# Patient Record
Sex: FEMALE | Race: BLACK OR AFRICAN AMERICAN
[De-identification: names, ages, dates, MRNs, and addresses within clinical notes are randomized per-mention and may not be internally consistent; named-entity substitution may affect disease eponyms.]

---

## 2020-07-15 ENCOUNTER — HOSPITAL ENCOUNTER (EMERGENCY)
Dept: HOSPITAL 62 - ER | Age: 65
Discharge: LEFT BEFORE DISCHARGE | End: 2020-07-15
Payer: COMMERCIAL

## 2020-07-15 VITALS — DIASTOLIC BLOOD PRESSURE: 88 MMHG | SYSTOLIC BLOOD PRESSURE: 146 MMHG

## 2020-07-15 DIAGNOSIS — R11.2: ICD-10-CM

## 2020-07-15 DIAGNOSIS — R10.2: ICD-10-CM

## 2020-07-15 DIAGNOSIS — R19.7: ICD-10-CM

## 2020-07-15 DIAGNOSIS — K57.30: ICD-10-CM

## 2020-07-15 DIAGNOSIS — K40.30: Primary | ICD-10-CM

## 2020-07-15 DIAGNOSIS — K56.699: ICD-10-CM

## 2020-07-15 LAB
ADD MANUAL DIFF: NO
ALBUMIN SERPL-MCNC: 4.2 G/DL (ref 3.5–5)
ALP SERPL-CCNC: 80 U/L (ref 38–126)
ANION GAP SERPL CALC-SCNC: 6 MMOL/L (ref 5–19)
APPEARANCE UR: (no result)
APTT PPP: YELLOW S
AST SERPL-CCNC: 23 U/L (ref 14–36)
BASOPHILS # BLD AUTO: 0 10^3/UL (ref 0–0.2)
BASOPHILS NFR BLD AUTO: 0.9 % (ref 0–2)
BILIRUB DIRECT SERPL-MCNC: 0.1 MG/DL (ref 0–0.4)
BILIRUB SERPL-MCNC: 0.7 MG/DL (ref 0.2–1.3)
BILIRUB UR QL STRIP: NEGATIVE
BUN SERPL-MCNC: 9 MG/DL (ref 7–20)
CALCIUM: 9.5 MG/DL (ref 8.4–10.2)
CHLORIDE SERPL-SCNC: 103 MMOL/L (ref 98–107)
CO2 SERPL-SCNC: 30 MMOL/L (ref 22–30)
EOSINOPHIL # BLD AUTO: 0 10^3/UL (ref 0–0.6)
EOSINOPHIL NFR BLD AUTO: 0.6 % (ref 0–6)
ERYTHROCYTE [DISTWIDTH] IN BLOOD BY AUTOMATED COUNT: 15.4 % (ref 11.5–14)
GLUCOSE SERPL-MCNC: 123 MG/DL (ref 75–110)
GLUCOSE UR STRIP-MCNC: NEGATIVE MG/DL
HCT VFR BLD CALC: 42.1 % (ref 36–47)
HGB BLD-MCNC: 14.2 G/DL (ref 12–15.5)
KETONES UR STRIP-MCNC: (no result) MG/DL
LYMPHOCYTES # BLD AUTO: 1.3 10^3/UL (ref 0.5–4.7)
LYMPHOCYTES NFR BLD AUTO: 23.5 % (ref 13–45)
MCH RBC QN AUTO: 31.7 PG (ref 27–33.4)
MCHC RBC AUTO-ENTMCNC: 33.7 G/DL (ref 32–36)
MCV RBC AUTO: 94 FL (ref 80–97)
MONOCYTES # BLD AUTO: 0.5 10^3/UL (ref 0.1–1.4)
MONOCYTES NFR BLD AUTO: 8.3 % (ref 3–13)
NEUTROPHILS # BLD AUTO: 3.7 10^3/UL (ref 1.7–8.2)
NEUTS SEG NFR BLD AUTO: 66.7 % (ref 42–78)
NITRITE UR QL STRIP: NEGATIVE
PH UR STRIP: 5 [PH] (ref 5–9)
PLATELET # BLD: 191 10^3/UL (ref 150–450)
POTASSIUM SERPL-SCNC: 3.9 MMOL/L (ref 3.6–5)
PROT SERPL-MCNC: 8.1 G/DL (ref 6.3–8.2)
PROT UR STRIP-MCNC: 30 MG/DL
RBC # BLD AUTO: 4.48 10^6/UL (ref 3.72–5.28)
SP GR UR STRIP: 1.02
TOTAL CELLS COUNTED % (AUTO): 100 %
UROBILINOGEN UR-MCNC: NEGATIVE MG/DL (ref ?–2)
WBC # BLD AUTO: 5.6 10^3/UL (ref 4–10.5)

## 2020-07-15 PROCEDURE — 81001 URINALYSIS AUTO W/SCOPE: CPT

## 2020-07-15 PROCEDURE — 80053 COMPREHEN METABOLIC PANEL: CPT

## 2020-07-15 PROCEDURE — 87086 URINE CULTURE/COLONY COUNT: CPT

## 2020-07-15 PROCEDURE — 85025 COMPLETE CBC W/AUTO DIFF WBC: CPT

## 2020-07-15 PROCEDURE — 96361 HYDRATE IV INFUSION ADD-ON: CPT

## 2020-07-15 PROCEDURE — 74177 CT ABD & PELVIS W/CONTRAST: CPT

## 2020-07-15 PROCEDURE — 99284 EMERGENCY DEPT VISIT MOD MDM: CPT

## 2020-07-15 PROCEDURE — 96376 TX/PRO/DX INJ SAME DRUG ADON: CPT

## 2020-07-15 PROCEDURE — 36415 COLL VENOUS BLD VENIPUNCTURE: CPT

## 2020-07-15 PROCEDURE — 96365 THER/PROPH/DIAG IV INF INIT: CPT

## 2020-07-15 PROCEDURE — 96375 TX/PRO/DX INJ NEW DRUG ADDON: CPT

## 2020-07-15 NOTE — PDOC CONSULTATION
Consultation


Consult Date: 07/15/20


Provider Consulted: VITO URBINA


Consult reason:: Incarcerated left inguinal hernia





History of Present Illness


Admission Date/PCP: 


  





  CHAS COOLEY PA-C





History of Present Illness: 


NIKO STREETER is a 65 year old female who started complaining of pains along 

the left inguinal area with a lump last night.  Patient does claim that she has 

been very active and may have lifted something heavy.  She complained of nausea 

and pains along the left inguinal area.  She had a CT scan of the abdomen which 

showed incarcerated left inguinal hernia with starting bowel obstruction.  

Attempt to reduce the hernia by ED provider was done.  I also attempted to 

reduce the hernia in the ED but unable to do so.  At this point I did inform the

patient that she needs an emergency reduction and repair of the incarcerated 

left inguinal hernia.  She tells me she wants to go to [place] if she needs 

an operation.  I called her son and told him the need for repair of the hernia 

and the son also feels like they would prefer to go to [place].  I told him 

that have to get a surgeon there and they may have to cover transfer expenses.  

I then went back to talk with the patient and she gave me her phone since her 

brother would like to talk to me.  Mention to him about the need for an emergent

reduction of the inguinal hernia.  The patient tells me that her family really 

wanted her to go to [place].  Since we do the operation at [facility] 

the they have to make the arrangements or she may have to sign AMA and then go 

to their emergency room soon as possible.











Social History


Smoking Status: Never Smoker





Family History


Parental Family History Reviewed: Yes


Children Family History Reviewed: No


Sibling(s) Family History Reviewed.: No





Medication/Allergy


Allergies/Adverse Reactions: 


                                        





No Known Allergies Allergy (Verified 07/15/20 12:54)


   











Review of Systems


Review of Systems: 





Left inguinal pains with nausea





Physical Exam


Vital Signs: 


                                        











Temp Pulse Resp BP Pulse Ox


 


 98.6 F   70   20   146/88 H  98 


 


 07/15/20 18:00  07/15/20 17:52  07/15/20 17:52  07/15/20 17:52  07/15/20 17:52








                                 Intake & Output











 07/14/20 07/15/20 07/16/20





 06:59 06:59 06:59


 


Intake Total   1050


 


Balance   1050


 


Weight   97.522 kg











Exam: 





Also lump on the left groin that is tender on palpation.  I failed to reduce the

inguinal hernia in the ED.  Also attempt by ED provider after giving fentanyl 

was unsuccessful





Results


Laboratory Results: 


                                        





                                 07/15/20 12:25 





                                 07/15/20 12:25 





                                        











  07/15/20 07/15/20 07/15/20





  12:25 12:25 12:37


 


WBC  5.6  


 


RBC  4.48  


 


Hgb  14.2  


 


Hct  42.1  


 


MCV  94  


 


MCH  31.7  


 


MCHC  33.7  


 


RDW  15.4 H  


 


Plt Count  191  


 


Seg Neutrophils %  66.7  


 


Sodium   139.3 


 


Potassium   3.9 


 


Chloride   103 


 


Carbon Dioxide   30 


 


Anion Gap   6 


 


BUN   9 


 


Creatinine   0.94 


 


Est GFR ( Amer)   > 60 


 


Glucose   123 H 


 


Calcium   9.5 


 


Total Bilirubin   0.7 


 


AST   23 


 


Alkaline Phosphatase   80 


 


Total Protein   8.1 


 


Albumin   4.2 


 


Urine Color    YELLOW


 


Urine Appearance    SLIGHTLY-CLOUDY


 


Urine pH    5.0


 


Ur Specific Gravity    1.019


 


Urine Protein    30 H


 


Urine Glucose (UA)    NEGATIVE


 


Urine Ketones    TRACE H


 


Urine Blood    MODERATE H


 


Urine Nitrite    NEGATIVE


 


Ur Leukocyte Esterase    LARGE H


 


Urine WBC (Auto)    30


 


Urine RBC (Auto)    6











Impressions: 


                                        





Abdomen/Pelvis CT  07/15/20 14:41


IMPRESSION:  1.  Apparently incarcerated left inguinal hernia.  Correlate 

clinically for strangulation.


2.  Small bowel obstruction secondary to the above.


3.  Sigmoid diverticulosis.


4.  Probably uterine fibroids.


 














Assessment & Plan





- Diagnosis


(1) Incarcerated hernia


Is this a current diagnosis for this admission?: Yes   





- Time


Time Spent: 30 to 50 Minutes





- Plan Summary


Plan Summary: 





65-year-old female who suddenly noted painful lump on the left groin last night.

 This was associated with nausea.  CT scan of the abdomen revealed an 

incarcerated left inguinal hernia with starting bowel obstruction.  Attempts to 

reduce the hernia in the ER was unsuccessful.  Patient was told of need for 

emergent reduction and repair of left inguinal hernia.  Patient was insistent on

going to [place] if she needs an operation.  This was even after talking to 

patient's son and brother.  Though prior to patient's final decision of leaving 

the hospital AMA she said that her family actually wanted her to go to 

[place].

## 2020-07-15 NOTE — RADIOLOGY REPORT (SQ)
EXAM DESCRIPTION:  CT ABD/PELVIS WITH IV ONLY



IMAGES COMPLETED DATE/TIME:  7/15/2020 3:34 pm



REASON FOR STUDY:  LLQ, pelvic pain, +palp firm mass to lower abd



COMPARISON:  None.



TECHNIQUE:  CT scan of the abdomen and pelvis performed using helical scanning technique with dynamic
 intravenous contrast injection.  No oral contrast. Images reviewed with lung, soft tissue, and bone 
windows. Reconstructed coronal and sagittal MPR images reviewed. Delayed images for evaluation of the
 urinary system also acquired. All images stored on PACS.

All CT scanners at this facility use dose modulation, iterative reconstruction, and/or weight based d
osing when appropriate to reduce radiation dose to as low as reasonably achievable (ALARA).

[facility]: Dose Right  [facility]: CareDose    [facility]: Dose Right    [facility]: Teradose 4D    [facility]: Smart Technologies



CONTRAST TYPE AND DOSE:  contrast/concentration: Isovue 350.00 mmol/ml; Total Contrast Delivered: 100
.0 ml; Total Saline Delivered: 72.0 ml



RENAL FUNCTION:  BUN 9 creatinine 0.94



RADIATION DOSE:  CT Rad equipment meets quality standard of care and radiation dose reduction techniq
ues were employed. CTDIvol: NaN - NaN mGy. DLP: 0 mGy-cm..



LIMITATIONS:  None.



FINDINGS:  LOWER CHEST: No significant findings. No nodules or infiltrates.

LIVER: Normal size. No masses.  No dilated ducts.

SPLEEN: Normal size. No focal lesions.

PANCREAS: No masses. No significant calcifications. No adjacent inflammation or peripancreatic fluid 
collections. Pancreatic duct not dilated.

GALLBLADDER: No identified stones by CT criteria. No inflammatory changes to suggest cholecystitis.

ADRENAL GLANDS: No significant masses or asymmetry.

RIGHT KIDNEY AND URETER: No solid masses.   No significant calcifications.   No hydronephrosis or hyd
roureter.

LEFT KIDNEY AND URETER: No solid masses.   No significant calcifications.   No hydronephrosis or hydr
oureter.

AORTA AND VESSELS: No aneurysm. No dissection. Renal arteries, SMA, celiac without stenosis.

RETROPERITONEUM: No retroperitoneal adenopathy, hemorrhage or masses.

BOWEL AND PERITONEAL CAVITY: Dilated duodenum and jejunum.  The ileum is not dilated.  No bowel mass 
is appreciated.  There is minimal free fluid in the abdomen.  Sigmoid diverticulosis with no acute in
flammation.

APPENDIX: Not identified.

PELVIS: Prominent uterine fibroids are present.

ABDOMINAL WALL: There is a large left inguinal hernia that contains bowel.

BONES: No significant osseous abnormality.

OTHER: No other significant finding.



IMPRESSION:  1.  Apparently incarcerated left inguinal hernia.  Correlate clinically for strangulatio
n.

2.  Small bowel obstruction secondary to the above.

3.  Sigmoid diverticulosis.

4.  Probably uterine fibroids.



COMMENT:   Pertinent findings on the imaging study reported as a CRITICAL RESULT to ROQUE GAUTAM NP
 at15:57 on 7/15/2020.

Category of Critical Result: Incarcerated inguinal hernia



TECHNICAL DOCUMENTATION:  JOB ID:  [id number]

Quality ID # [id number]: Final reports with documentation of one or more dose reduction techniques (e.g., Au
tomated exposure control, adjustment of the mA and/or kV according to patient size, use of iterative 
reconstruction technique)

 2011 [email or web address]- All Rights Reserved



Reading location - IP/workstation name: [email or web address]

## 2020-07-15 NOTE — ER DOCUMENT REPORT
ED GI/





- General


Chief Complaint: Abdominal Pain


Stated Complaint: ABDOMINAL PAIN,VOMITING,DIARRHEA


Time Seen by Provider: 07/15/20 14:04


Primary Care Provider: 


CHAS COOLEY PA-C [Primary Care Provider] - Follow up as needed


Mode of Arrival: Ambulatory


Information source: Patient


Notes: 





Patient presents complaining of lower abdominal pain for the past 3 days.  

Patient states that she has had nausea vomiting diarrhea.  Patient states she 

vomited yesterday although has not had any emesis today.  Patient states she is 

had diarrhea x4 episodes.  Patient denies any fever or dysuria symptoms.  

Patient denies any blood in her vomit or stool.





- HPI


Patient complains to provider of: Abdominal pain


Onset: Other - 3 days


Timing/Duration: Worse


Quality of pain: Achy


Pain Level: 4


Location: Pelvis


Vaginal bleeding (Compared to normal period): None


Associated symptoms: Diarrhea, Vomiting.  denies: Dysuria, Fever, Urinary 

hesitancy, Urinary frequency, Urinary retention, Urinary urgency, Vaginal 

discharge


Exacerbated by: Movement


Relieved by: Denies


Similar symptoms previously: No


Recently seen / treated by doctor: No





- Related Data


Allergies/Adverse Reactions: 


                                        





No Known Allergies Allergy (Verified 07/15/20 12:54)


   











Past Medical History





- General


Information source: Patient





- Social History


Smoking Status: Never Smoker


Frequency of alcohol use: None


Drug Abuse: None


Occupation: None


Family History: Reviewed & Not Pertinent


Patient has homicidal ideation: No





- Medical History


Medical History: Negative


Past Surgical History: Reports: Hx Breast Surgery - cyst removal





Review of Systems





- Review of Systems


Constitutional: No symptoms reported.  denies: Fever


EENT: No symptoms reported


Cardiovascular: No symptoms reported


Respiratory: No symptoms reported.  denies: Cough


Gastrointestinal: Abdominal pain, Diarrhea, Nausea, Vomiting


Genitourinary: No symptoms reported.  denies: Burning


Female Genitourinary: No symptoms reported


Musculoskeletal: No symptoms reported.  denies: Back pain


Skin: No symptoms reported


Hematologic/Lymphatic: No symptoms reported


Neurological/Psychological: No symptoms reported





Physical Exam





- Vital signs


Vitals: 


                                        











Temp


 


 98.3 F 


 


 07/15/20 11:55














- General


General appearance: Appears well, Alert


In distress: Mild





- HEENT


Head: Normocephalic, Atraumatic


Eyes: Normal


Conjunctiva: Normal


Nasal: Normal


Mouth/Lips: Normal


Mucous membranes: Normal


Neck: Normal, Supple.  No: Lymphadenopathy





- Respiratory


Respiratory status: No respiratory distress


Chest status: Nontender


Breath sounds: Normal.  No: Rales, Rhonchi, Stridor, Wheezing


Chest palpation: Normal





- Cardiovascular


Rhythm: Regular


Heart sounds: S1 appreciated, S2 appreciated


Murmur: No





- Abdominal


Inspection: Obese


Distension: No distension


Bowel sounds: Normal


Tenderness: Tender - Suprapubic, left lower pelvic tenderness, patient with 

palpable tender firm mass to lower pelvic area


Organomegaly: No organomegaly





- Back


Back: Normal, Nontender.  No: CVA tenderness





- Extremities


General upper extremity: Normal inspection, Nontender, Normal strength


General lower extremity: Normal inspection, Nontender, Normal strength





- Neurological


Neuro grossly intact: Yes


Cognition: Normal


Orientation: AAOx4


Deborah Coma Scale Eye Opening: Spontaneous


Deborah Coma Scale Verbal: Oriented


Wolbach Coma Scale Motor: Obeys Commands


Deborah Coma Scale Total: 15





- Psychological


Associated symptoms: Normal affect, Normal mood





- Skin


Skin Temperature: Warm


Skin Moisture: Dry


Skin Color: Normal





Course





- Re-evaluation


Re-evalutation: 





07/15/20 16:05


Received call from radiology staff stating that patient has an incarcerated 

hernia.  Attempted consultation with surgeon, Dr. Nava currently in a 

surgery, will call back.  Charge nurse notified of need for rapid COVID test.


07/15/20 16:47


Patient is refusing rapid, testing at this time.  Patient states that she does 

not want to stay and that she has too many things to do at home.  Patient also 

states she is trying to go to her grandsons graduation.  Patient advised that 

she has a potential life-threatening surgical emergency and that she needs to 

stay for further evaluation and management of her symptoms.  Patient is 

agreeable with staying and being evaluated by the surgeon although continues to 

voice that she does not want to be admitted at this time.


07/15/20 16:59


Dr. Nava to evaluate patient, advises giving patient 100 mcg of fentanyl to 

help with pain at this time.


07/15/20 17:18


Surgeon states that he spoke with patient and she is insistent on wanting to go 

to [place].  He advises having her sign AMA at this time.  Advises canceling 

the dose of the fentanyl.


07/15/20 


Patient was advised that she has a potentially life-threatening emergent 

surgical problem that needs immediate intervention.  The patient has decided not

to proceed with further recommended testing or treatment to determine the cause 

of her symptoms.  The risk and alternatives to the recommendation were discussed

the patient voiced understanding.  The patient appears clinically to have the 

capacity to make this decision.  The patient was instructed that they could 

return to the ER at any time to complete the testing or treatment.











- Vital Signs


Vital signs: 


                                        











Temp Pulse Resp BP Pulse Ox


 


 98.6 F   70   20   146/88 H  98 


 


 07/15/20 18:00  07/15/20 17:52  07/15/20 17:52  07/15/20 17:52  07/15/20 17:52














- Laboratory


Result Diagrams: 


                                 07/15/20 12:25





                                 07/15/20 12:25


Laboratory results interpreted by me: 


                                        











  07/15/20 07/15/20 07/15/20





  12:25 12:25 12:37


 


RDW  15.4 H  


 


Glucose   123 H 


 


Urine Protein    30 H


 


Urine Ketones    TRACE H


 


Urine Blood    MODERATE H


 


Ur Leukocyte Esterase    LARGE H











07/15/20 21:41





                              Labs- All tests 24 hr











  07/15/20 07/15/20 07/15/20





  12:25 12:25 12:37


 


WBC  5.6  


 


RBC  4.48  


 


Hgb  14.2  


 


Hct  42.1  


 


MCV  94  


 


MCH  31.7  


 


MCHC  33.7  


 


RDW  15.4 H  


 


Plt Count  191  


 


Lymph % (Auto)  23.5  


 


Mono % (Auto)  8.3  


 


Eos % (Auto)  0.6  


 


Baso % (Auto)  0.9  


 


Absolute Neuts (auto)  3.7  


 


Absolute Lymphs (auto)  1.3  


 


Absolute Monos (auto)  0.5  


 


Absolute Eos (auto)  0.0  


 


Absolute Basos (auto)  0.0  


 


Seg Neutrophils %  66.7  


 


Sodium   139.3 


 


Potassium   3.9 


 


Chloride   103 


 


Carbon Dioxide   30 


 


Anion Gap   6 


 


BUN   9 


 


Creatinine   0.94 


 


Est GFR ( Amer)   > 60 


 


Est GFR (MDRD) Non-Af   > 60 


 


Glucose   123 H 


 


Calcium   9.5 


 


Total Bilirubin   0.7 


 


Direct Bilirubin   0.1 


 


Neonat Total Bilirubin   Not Reportable 


 


Neonat Direct Bilirubin   Not Reportable 


 


Neonat Indirect Bili   Not Reportable 


 


AST   23 


 


ALT   30 


 


Alkaline Phosphatase   80 


 


Total Protein   8.1 


 


Albumin   4.2 


 


Urine Color    YELLOW


 


Urine Appearance    SLIGHTLY-CLOUDY


 


Urine pH    5.0


 


Ur Specific Gravity    1.019


 


Urine Protein    30 H


 


Urine Glucose (UA)    NEGATIVE


 


Urine Ketones    TRACE H


 


Urine Blood    MODERATE H


 


Urine Nitrite    NEGATIVE


 


Urine Bilirubin    NEGATIVE


 


Urine Urobilinogen    NEGATIVE


 


Ur Leukocyte Esterase    LARGE H


 


Urine WBC (Auto)    30


 


Urine RBC (Auto)    6


 


U Hyaline Cast (Auto)    13


 


Squamous Epi Cells Auto    7


 


WBC Casts (Auto)    5


 


Urine Mucus (Auto)    FEW


 


Urine Ascorbic Acid    NEGATIVE














- Diagnostic Test


Radiology reviewed: Reports reviewed





Discharge





- Discharge


Clinical Impression: 


 Incarcerated hernia





Abdominal pain


Qualifiers:


 Abdominal location: lower abdomen, unspecified Qualified Code(s): R10.30 - 

Lower abdominal pain, unspecified





Condition: Serious


Disposition: AGAINST MEDICAL ADVICE


Additional Instructions: 


You have an acute surgical emergency.  It is advised that need to have surgery 

to fix your incarcerated hernia.  Return immediately if you decide that she 

would like to continue treatment and have surgery for this potentially life-

threatening emergency.








Referrals: 


CHAS COOLEY PA-C [Primary Care Provider] - Follow up as needed

## 2020-10-05 ENCOUNTER — HOSPITAL ENCOUNTER (EMERGENCY)
Dept: HOSPITAL 62 - ER | Age: 65
Discharge: HOME | End: 2020-10-05
Payer: COMMERCIAL

## 2020-10-05 VITALS — DIASTOLIC BLOOD PRESSURE: 77 MMHG | SYSTOLIC BLOOD PRESSURE: 127 MMHG

## 2020-10-05 DIAGNOSIS — R00.1: ICD-10-CM

## 2020-10-05 DIAGNOSIS — R53.1: ICD-10-CM

## 2020-10-05 DIAGNOSIS — R42: ICD-10-CM

## 2020-10-05 DIAGNOSIS — R10.13: Primary | ICD-10-CM

## 2020-10-05 DIAGNOSIS — R11.2: ICD-10-CM

## 2020-10-05 LAB
ADD MANUAL DIFF: NO
ALBUMIN SERPL-MCNC: 4.5 G/DL (ref 3.5–5)
ALP SERPL-CCNC: 83 U/L (ref 38–126)
ANION GAP SERPL CALC-SCNC: 10 MMOL/L (ref 5–19)
APPEARANCE UR: CLEAR
APTT PPP: YELLOW S
AST SERPL-CCNC: 29 U/L (ref 14–36)
BASOPHILS # BLD AUTO: 0 10^3/UL (ref 0–0.2)
BASOPHILS NFR BLD AUTO: 0.6 % (ref 0–2)
BILIRUB DIRECT SERPL-MCNC: 0.3 MG/DL (ref 0–0.4)
BILIRUB SERPL-MCNC: 0.6 MG/DL (ref 0.2–1.3)
BILIRUB UR QL STRIP: NEGATIVE
BUN SERPL-MCNC: 19 MG/DL (ref 7–20)
CALCIUM: 9.2 MG/DL (ref 8.4–10.2)
CHLORIDE SERPL-SCNC: 108 MMOL/L (ref 98–107)
CO2 SERPL-SCNC: 25 MMOL/L (ref 22–30)
EOSINOPHIL # BLD AUTO: 0 10^3/UL (ref 0–0.6)
EOSINOPHIL NFR BLD AUTO: 0.1 % (ref 0–6)
ERYTHROCYTE [DISTWIDTH] IN BLOOD BY AUTOMATED COUNT: 15 % (ref 11.5–14)
GLUCOSE SERPL-MCNC: 117 MG/DL (ref 75–110)
GLUCOSE UR STRIP-MCNC: NEGATIVE MG/DL
HCT VFR BLD CALC: 38.8 % (ref 36–47)
HGB BLD-MCNC: 13.2 G/DL (ref 12–15.5)
KETONES UR STRIP-MCNC: 20 MG/DL
LYMPHOCYTES # BLD AUTO: 1.1 10^3/UL (ref 0.5–4.7)
LYMPHOCYTES NFR BLD AUTO: 18.1 % (ref 13–45)
MCH RBC QN AUTO: 31.9 PG (ref 27–33.4)
MCHC RBC AUTO-ENTMCNC: 34.1 G/DL (ref 32–36)
MCV RBC AUTO: 94 FL (ref 80–97)
MONOCYTES # BLD AUTO: 0.2 10^3/UL (ref 0.1–1.4)
MONOCYTES NFR BLD AUTO: 3.1 % (ref 3–13)
NEUTROPHILS # BLD AUTO: 4.9 10^3/UL (ref 1.7–8.2)
NEUTS SEG NFR BLD AUTO: 78.1 % (ref 42–78)
NITRITE UR QL STRIP: NEGATIVE
PH UR STRIP: 5 [PH] (ref 5–9)
PLATELET # BLD: 184 10^3/UL (ref 150–450)
POTASSIUM SERPL-SCNC: 4.3 MMOL/L (ref 3.6–5)
PROT SERPL-MCNC: 8.4 G/DL (ref 6.3–8.2)
PROT UR STRIP-MCNC: NEGATIVE MG/DL
RBC # BLD AUTO: 4.15 10^6/UL (ref 3.72–5.28)
SP GR UR STRIP: 1.02
TOTAL CELLS COUNTED % (AUTO): 100 %
UROBILINOGEN UR-MCNC: NEGATIVE MG/DL (ref ?–2)
WBC # BLD AUTO: 6.3 10^3/UL (ref 4–10.5)

## 2020-10-05 PROCEDURE — 80053 COMPREHEN METABOLIC PANEL: CPT

## 2020-10-05 PROCEDURE — 81001 URINALYSIS AUTO W/SCOPE: CPT

## 2020-10-05 PROCEDURE — 96374 THER/PROPH/DIAG INJ IV PUSH: CPT

## 2020-10-05 PROCEDURE — 96361 HYDRATE IV INFUSION ADD-ON: CPT

## 2020-10-05 PROCEDURE — 71275 CT ANGIOGRAPHY CHEST: CPT

## 2020-10-05 PROCEDURE — 99285 EMERGENCY DEPT VISIT HI MDM: CPT

## 2020-10-05 PROCEDURE — 83690 ASSAY OF LIPASE: CPT

## 2020-10-05 PROCEDURE — 85025 COMPLETE CBC W/AUTO DIFF WBC: CPT

## 2020-10-05 PROCEDURE — 84484 ASSAY OF TROPONIN QUANT: CPT

## 2020-10-05 PROCEDURE — 36415 COLL VENOUS BLD VENIPUNCTURE: CPT

## 2020-10-05 PROCEDURE — 93005 ELECTROCARDIOGRAM TRACING: CPT

## 2020-10-05 PROCEDURE — 74177 CT ABD & PELVIS W/CONTRAST: CPT

## 2020-10-05 PROCEDURE — 93010 ELECTROCARDIOGRAM REPORT: CPT

## 2020-10-05 NOTE — RADIOLOGY REPORT (SQ)
EXAM DESCRIPTION: 



CT ABDOMEN PELVIS WITH IV CONTRAST



COMPLETED DATE/TME:  10/05/2020 17:46



CLINICAL HISTORY: 



65 years, Female, epigastric pain



COMPARISON:

CT from 7/15/2020.



TECHNIQUE:

Axial images with 100 mL of Omnipaque 350. Sagittal coronal

reconstruction.  Images stored on PACS.

 

All CT scanners at this facility use dose modulation, iterative

reconstruction, and/or weight based dosing when appropriate to

reduce radiation dose to as low as reasonably achievable (ALARA).







FINDINGS:



Tiny hiatal hernia smaller compared to previous study. Liver,

spleen, pancreas, biliary system, adrenal glands, kidneys, aorta

and para-aortic regions are unremarkable.



Small bowel loops are unremarkable. Previous small bowel

obstruction resolved radiographically. Anastomotic surgery in the

left lower quadrant close to the midline. Right colon mildly

dilated. Transverse and descending colon demonstrate question

septal colitis. Series 3 image 142. Finding may be artifact from

contraction.



CT of the pelvis demonstrates multiple large masses associated

with the uterus suspicious for fibroids. One of the masses is

multiple calcifications. A second mass has relative low density

presumably necrosis. There is no significant change since

7/15/2020. Urinary bladder compressed by the large uterus and

grossly unremarkable. No suspicious enlargement of the adnexa or

free fluid. No adenopathy is seen. Vascular structures are

unremarkable. Sigmoid diverticulosis without acute

diverticulitis. Bony pelvis is unremarkable.





IMPRESSION:





1. Recent CT from 7/15/2020 demonstrated obvious small bowel

obstruction. Findings are completely resolved. Anastomotic

surgery is present.

2. Very subtle thickening of the transverse and descending colon

is seen. Question subtle colitis versus artifact from

contraction. Sigmoid diverticulosis without acute diverticulitis.

3. Large significantly fibroid uterus.

## 2020-10-05 NOTE — EKG REPORT
SEVERITY:- BORDERLINE ECG -

SINUS RHYTHM

BORDERLINE T ABNORMALITIES, INFERIOR LEADS

:

Confirmed by: Ya Flaherty MD 05-Oct-2020 21:50:27

## 2020-10-05 NOTE — ER DOCUMENT REPORT
Doctor's Note


Notes: 





10/05/20 21:25


Patient was signed out to me.  


I personally evaluated the patient, she is alert and oriented.  She is resting 

comfortably.  Patient is speaking in full sentences and has nonlabored 

breathing.  Her abdominal exam is benign.  I reviewed all results with the 

patient.





I reviewed patient's CT and reports.  Patient has no evidence for a PE.  

Previous SBO has resolved.  There was a question of possible colitis versus 

artifact.  She has no white count or vomiting.  She does not have any abdominal 

pain on my exam.  I informed patient patient that she should follow-up with her 

PCP.  Meclizine and Zofran were prescribed by the previous provider.  She was 

told to eat a bland diet including rice, bananas, applesauce until she feels 

better.  I informed her to avoid dairy and heavy meals until she feels better 

also.  She was also told to stay hydrated.  Patient was given strict return 

precautions including vomiting, fever, abdominal pain and she verbalized 

understanding.

## 2020-10-05 NOTE — RADIOLOGY REPORT (SQ)
EXAM DESCRIPTION: 



CT CHEST ANGIOGRAPHY WITHOUT THEN WITH IV CONTRAST



COMPLETED DATE/TME:  10/05/2020 17:46



CLINICAL HISTORY: 



65 years, Female, lower cp



COMPARISON:

None.



TECHNIQUE:

Axial images with IV contrast. Contrast dose not available. MIP

reconstruction.  Images stored on PACS.

 

All CT scanners at this facility use dose modulation, iterative

reconstruction, and/or weight based dosing when appropriate to

reduce radiation dose to as low as reasonably achievable (ALARA).







FINDINGS:



Motion artifact. Dilated 32 mm main pulmonary artery. No evidence

for central pulmonary artery. Peripheral vessels less well

evaluated. No obvious embolus. Normal caliber aorta without

dissection. Borderline heart size. No evidence for pericardial

effusion or mediastinal adenopathy. No acute lung or pleural

abnormalities. No suspicious bony lesions.





IMPRESSION:





1. Limited study of the vascular structures and the lungs

secondary to motion artifact.

2. Suspected pulmonary hypertension. No obvious PE.

3. No acute findings in the chest.

## 2020-10-05 NOTE — EKG REPORT
SEVERITY:- BORDERLINE ECG -

SINUS RHYTHM

BORDERLINE T ABNORMALITIES, INFERIOR LEADS

:

Confirmed by: Ya Flaherty MD 05-Oct-2020 21:50:33

## 2020-10-05 NOTE — ER DOCUMENT REPORT
ED General





- General


Chief Complaint: Nausea/Vomiting


Stated Complaint: VOMITING


Time Seen by Provider: 10/05/20 13:05


Primary Care Provider: 


CHAS COOLEY PA-C [Primary Care Provider] - Follow up as needed


Mode of Arrival: Wheelchair


Information source: Patient


TRAVEL OUTSIDE OF THE [place] IN LAST 30 DAYS: No





- HPI


Notes: 





Patient presents with epigastric pain some nausea and some dizziness.  She 

states it started sometime early this morning.  She states she was having a 

normal day and felt fine yesterday.  She denies any chest pain.  No shortness of

breath.  She states that the abdominal pain was mainly in the epigastric area 

and was sharp.  Nothing made it better or worse.  Did not radiate.  No problems 

with urination or bowel movements.  No fevers.  No chills.  Patient symptoms 

were constant.  They were sharp.  They are moderate.





- Related Data


Allergies/Adverse Reactions: 


                                        





No Known Allergies Allergy (Verified 07/15/20 12:54)


   











Past Medical History





- General


Information source: Patient





- Social History


Smoking Status: Never Smoker


Chew tobacco use (# tins/day): No


Frequency of alcohol use: None


Drug Abuse: None


Family History: Reviewed & Not Pertinent


Past Surgical History: Reports: Hx Breast Surgery - cyst removal





Review of Systems





- Review of Systems


Constitutional: Weakness.  denies: Recent illness


Cardiovascular: denies: Chest pain, Palpitations


Respiratory: denies: Cough, Short of breath


-: Yes All other systems reviewed and negative





Physical Exam





- Vital signs


Vitals: 





                                        











Temp Pulse Resp BP Pulse Ox


 


 97.5 F   62   20   133/55 H  97 


 


 10/05/20 12:24  10/05/20 12:24  10/05/20 12:24  10/05/20 12:24  10/05/20 12:24











Interpretation: Normal





- General


General appearance: Appears well, Alert





- HEENT


Head: Normocephalic, Atraumatic


Eyes: Normal


Pupils: PERRL





- Respiratory


Respiratory status: No respiratory distress


Chest status: Nontender


Breath sounds: Normal


Chest palpation: Normal





- Cardiovascular


Rhythm: Regular


Heart sounds: Normal auscultation


Murmur: No





- Abdominal


Inspection: Normal


Distension: No distension


Bowel sounds: Normal


Tenderness: Nontender


Organomegaly: No organomegaly





- Back


Back: Normal, Nontender





- Extremities


General upper extremity: Normal inspection, Nontender, Normal color, Normal ROM,

Normal temperature


General lower extremity: Normal inspection, Nontender, Normal color, Normal ROM,

Normal temperature, Normal weight bearing.  No: Ritesh's sign





- Neurological


Neuro grossly intact: Yes


Cognition: Normal


Orientation: AAOx4


Penfield Coma Scale Eye Opening: Spontaneous


Penfield Coma Scale Verbal: Oriented


Deborah Coma Scale Motor: Obeys Commands


Penfield Coma Scale Total: 15


Speech: Normal


Motor strength normal: LUE, RUE, LLE, RLE


Sensory: Normal





- Psychological


Associated symptoms: Normal affect, Normal mood





- Skin


Skin Temperature: Warm


Skin Moisture: Dry


Skin Color: Normal





Course





- Re-evaluation


Re-evalutation: 





10/05/20 17:14


Patient presents with epigastric pain.  She has no evidence of pancreatitis.  I 

do not see any evidence of cardiac disease.  After fluids meclizine and Zofran 

she states she does feel better.  Vital signs are stable.  I believe at this 

time patient is stable for discharge.





- Vital Signs


Vital signs: 





                                        











Temp Pulse Resp BP Pulse Ox


 


 97.5 F   62   20   133/55 H  97 


 


 10/05/20 15:15  10/05/20 12:24  10/05/20 12:24  10/05/20 12:24  10/05/20 12:24














- Laboratory


Result Diagrams: 


                                 10/05/20 15:27





                                 10/05/20 15:27


Laboratory results interpreted by me: 





                                        











  10/05/20 10/05/20 10/05/20





  15:27 15:27 15:39


 


RDW  15.0 H  


 


Seg Neutrophils %  78.1 H  


 


Chloride   108 H 


 


Glucose   117 H 


 


Total Protein   8.4 H 


 


Urine Ketones    20 H


 


Urine Blood    SMALL H














- EKG Interpretation by Me


EKG shows normal: Sinus rhythm


Rate: Bradycardia - 56


Rhythm: NSR


Axis/QRS: No: Right axis deviation, Left axis deviation





Discharge





- Discharge


Clinical Impression: 


 Dizziness, Epigastric pain





Condition: Stable


Disposition: HOME, SELF-CARE


Instructions:  Abdominal Pain ([facility]), Dizziness ([facility])


Prescriptions: 


Meclizine HCl 25 mg PO Q6 PRN 3 Days #12 tablet


 PRN Reason: Dizziness


Ondansetron [Zofran Odt 4 mg Tablet] 1 - 2 tab PO Q4H PRN #15 tab.rapdis


 PRN Reason: For Nausea/Vomiting


Forms:  Return to Work


Referrals: 


CHAS COOLEY PA-C [Primary Care Provider] - Follow up tomorrow

## 2020-10-05 NOTE — ER DOCUMENT REPORT
ED Medical Screen (RME)





- General


Chief Complaint: Vomiting


Stated Complaint: VOMITING


Time Seen by Provider: 10/05/20 13:05


Primary Care Provider: 


CHAS COOLEY PA-C [Primary Care Provider] - Follow up as needed


Mode of Arrival: Wheelchair


Information source: Patient


Notes: 





HPI; 65-year-old female presents to the emergency room with sudden onset of 

nausea, vomiting, abdominal pain that started earlier today.  She denies any 

fevers.  No urinary symptoms.  Denies any recent travel.  Denies any COVID-19 

exposure.  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

 Denies any other ill contacts.





PE:


Alert and oriented x3.  Mild distress noted.  Lungs: Clear to auscultation 

without rales, rhonchi, wheezes.  Heart: Regular rate rhythm without murmurs, 

rubs, gallops.





I have greeted and performed a rapid initial assessment of this patient.  A 

comprehensive ED assessment and evaluation of the patient, analysis of test 

results and completion of the medical decision making process will be conducted 

by additional ED providers.  I have specifically instructed the patient or 

family members with the patient to immediately return to any nursing staff 

should anything change in the patient's condition or with their chief complaint.


TRAVEL OUTSIDE OF THE [place] IN LAST 30 DAYS: No





- Related Data


Allergies/Adverse Reactions: 


                                        





No Known Allergies Allergy (Verified 07/15/20 12:54)


   











Past Medical History


Past Surgical History: Reports: Hx Breast Surgery - cyst removal





Physical Exam





- Vital signs


Vitals: 





                                        











Temp Pulse Resp BP Pulse Ox


 


 97.5 F   62   20   133/55 H  97 


 


 10/05/20 12:24  10/05/20 12:24  10/05/20 12:24  10/05/20 12:24  10/05/20 12:24














Course





- Vital Signs


Vital signs: 





                                        











Temp Pulse Resp BP Pulse Ox


 


 97.5 F   62   20   133/55 H  97 


 


 10/05/20 12:24  10/05/20 12:24  10/05/20 12:24  10/05/20 12:24  10/05/20 12:24














Doctor's Discharge





- Discharge


Referrals: 


CHAS COOLEY PA-C [Primary Care Provider] - Follow up as needed

## 2020-12-16 LAB
APPEARANCE UR: CLEAR
APTT PPP: (no result) S
BILIRUB UR QL STRIP: NEGATIVE
ERYTHROCYTE [DISTWIDTH] IN BLOOD BY AUTOMATED COUNT: 14.5 % (ref 11.5–14)
GLUCOSE UR STRIP-MCNC: NEGATIVE MG/DL
HCT VFR BLD CALC: 38.5 % (ref 36–47)
HGB BLD-MCNC: 12.8 G/DL (ref 12–15.5)
KETONES UR STRIP-MCNC: NEGATIVE MG/DL
MCH RBC QN AUTO: 30.9 PG (ref 27–33.4)
MCHC RBC AUTO-ENTMCNC: 33.1 G/DL (ref 32–36)
MCV RBC AUTO: 93 FL (ref 80–97)
NITRITE UR QL STRIP: NEGATIVE
PH UR STRIP: 6 [PH] (ref 5–9)
PLATELET # BLD: 168 10^3/UL (ref 150–450)
PROT UR STRIP-MCNC: NEGATIVE MG/DL
RBC # BLD AUTO: 4.13 10^6/UL (ref 3.72–5.28)
SP GR UR STRIP: 1.01
UROBILINOGEN UR-MCNC: NEGATIVE MG/DL (ref ?–2)
WBC # BLD AUTO: 4 10^3/UL (ref 4–10.5)

## 2020-12-16 NOTE — EKG REPORT
SEVERITY:- ABNORMAL ECG -

SINUS RHYTHM

CONSIDER ANTEROSEPTAL INFARCT

BORDERLINE T ABNORMALITIES, INFERIOR LEADS

:

Confirmed by: Ya Flaherty MD 16-Dec-2020 10:43:32

## 2020-12-22 ENCOUNTER — HOSPITAL ENCOUNTER (OUTPATIENT)
Dept: HOSPITAL 62 - OROUT | Age: 65
Discharge: HOME | End: 2020-12-22
Attending: OBSTETRICS & GYNECOLOGY
Payer: COMMERCIAL

## 2020-12-22 VITALS — SYSTOLIC BLOOD PRESSURE: 128 MMHG | DIASTOLIC BLOOD PRESSURE: 68 MMHG

## 2020-12-22 DIAGNOSIS — Z20.828: ICD-10-CM

## 2020-12-22 DIAGNOSIS — N95.0: Primary | ICD-10-CM

## 2020-12-22 DIAGNOSIS — N84.0: ICD-10-CM

## 2020-12-22 PROCEDURE — 36415 COLL VENOUS BLD VENIPUNCTURE: CPT

## 2020-12-22 PROCEDURE — 81001 URINALYSIS AUTO W/SCOPE: CPT

## 2020-12-22 PROCEDURE — 87635 SARS-COV-2 COVID-19 AMP PRB: CPT

## 2020-12-22 PROCEDURE — 58558 HYSTEROSCOPY BIOPSY: CPT

## 2020-12-22 PROCEDURE — 93005 ELECTROCARDIOGRAM TRACING: CPT

## 2020-12-22 PROCEDURE — 00952 ANES VAG PX HYSTSC&/HSG: CPT

## 2020-12-22 PROCEDURE — 93010 ELECTROCARDIOGRAM REPORT: CPT

## 2020-12-22 PROCEDURE — 85027 COMPLETE CBC AUTOMATED: CPT

## 2020-12-22 PROCEDURE — 88305 TISSUE EXAM BY PATHOLOGIST: CPT

## 2020-12-22 PROCEDURE — C9803 HOPD COVID-19 SPEC COLLECT: HCPCS

## 2020-12-22 NOTE — OPERATIVE REPORT
Operative Report


DATE OF SURGERY: 12/22/20


PREOPERATIVE DIAGNOSIS: Postmenopausal bleeding


POSTOPERATIVE DIAGNOSIS: Postmenopausal bleeding and endometrial polyp


OPERATION: Hysteroscopy D&C


SURGEON: GUNNER ALSTON ASSISTANT: NIKO ELLIS


ANESTHESIA: LMAC


TISSUE REMOVED OR ALTERED: Endometrial polyp and curettings


COMPLICATIONS: 





None


ESTIMATED BLOOD LOSS: 50 cc


INTRAOPERATIVE FINDINGS: Uterus sounded to approximately 10-1/2 cm, endometrial 

polyp noted on hysteroscopy, cervix agglutinated and flat to the vaginal wall


PROCEDURE: 





Patient was taken to the operating room prepared and draped in normal sterile 

fashion in a dorsal lithotomy position.  There were conditions and In-N-Out cath

was performed with approximately 50 cc of clear urine obtained.  Placed into the

vagina the cervix was grasped and prepped with Betadine it was grasped on the 

anterior lip with a single-tooth tenaculum.  The uterus was sounded to the above

findings.  The cervix was dilated to accommodate a 5 mm hysteroscope which was 

introduced.  Atrial cavity was carefully inspected and the above findings were 

noted of a benign appearing polyp that was curled in on itself at the uterine 

fundus.  The hysteroscope was removed and a sharp curette was introduced sharp 

curettage produced some endometrial tissue.  The hysteroscope was reintroduced 

and the polyp was noted to still be intact.  The hysteroscope was removed and 

Edgard stones were inserted and the polyp was able to be grasped with Edgard 

stones and removed with blunt traction.  Several more passes with the Edgard 

stones did not reveal any more tissue.  Sharp curette was introduced once more 

and the good scraping was taken of the endometrial cavity.  Pulse tissue 

specimens were removed and placed on Telfa.  The procedure was then concluded 

minimal oozing was noted from the tenaculum site all instruments were removed.  

Patient was taken to recovery in stable condition

## 2020-12-22 NOTE — DISCHARGE SUMMARY
Discharge Summary (SDC)





- Discharge


Final Diagnosis: 





Postmenopausal bleeding


Date of Surgery: 12/22/20


Discharge Date: 12/22/20


Condition: Stable


Prescriptions: 


Oxycodone HCl/Acetaminophen [Percocet 5-325 mg Tablet] 1 tab PO Q4HP PRN #10 

tablet


 PRN Reason: 


Doxycycline Hyclate 100 mg PO BID #10 tablet.


Ibuprofen [Motrin 800 mg Tablet] 800 mg PO Q8H PRN #60 tablet


 PRN Reason: 


Referrals: 


CHAS COOLEY PA-C [Primary Care Provider] - 


Discharge Diet: As Tolerated


Respiratory Treatments at Home: Deep Breathing/Coughing


Discharge Activity: Activity As Tolerated, Balance Activity w/Rest, Pelvic Rest,

No tub bath, Walk Frequently


Home Care Assistance: None Needed


Report the Following to Your Physician Immediately: Shortness of Breath, Nausea,

Vomiting, Increase in Pain, Fever over 101 Degrees